# Patient Record
Sex: FEMALE | URBAN - METROPOLITAN AREA
[De-identification: names, ages, dates, MRNs, and addresses within clinical notes are randomized per-mention and may not be internally consistent; named-entity substitution may affect disease eponyms.]

---

## 2017-02-09 VITALS
DIASTOLIC BLOOD PRESSURE: 59 MMHG | OXYGEN SATURATION: 94 % | TEMPERATURE: 96 F | HEIGHT: 65 IN | SYSTOLIC BLOOD PRESSURE: 123 MMHG | WEIGHT: 181.44 LBS | RESPIRATION RATE: 16 BRPM | HEART RATE: 52 BPM

## 2017-02-09 NOTE — ASU PATIENT PROFILE, ADULT - PMH
DM (diabetes mellitus)    HTN (hypertension)    Thyroid disease Depression  anxiety  DM (diabetes mellitus)    HTN (hypertension)    Thyroid disease

## 2017-02-10 ENCOUNTER — OUTPATIENT (OUTPATIENT)
Dept: OUTPATIENT SERVICES | Facility: HOSPITAL | Age: 67
LOS: 1 days | Discharge: HOME | End: 2017-02-10
Payer: COMMERCIAL

## 2017-02-10 VITALS
DIASTOLIC BLOOD PRESSURE: 66 MMHG | RESPIRATION RATE: 16 BRPM | SYSTOLIC BLOOD PRESSURE: 130 MMHG | HEART RATE: 58 BPM | OXYGEN SATURATION: 96 %

## 2017-02-10 DIAGNOSIS — Z90.49 ACQUIRED ABSENCE OF OTHER SPECIFIED PARTS OF DIGESTIVE TRACT: Chronic | ICD-10-CM

## 2017-02-10 DIAGNOSIS — Z98.890 OTHER SPECIFIED POSTPROCEDURAL STATES: Chronic | ICD-10-CM

## 2017-02-10 PROCEDURE — 29881 ARTHRS KNE SRG MNISECTMY M/L: CPT | Mod: LT

## 2017-02-10 RX ORDER — SODIUM CHLORIDE 9 MG/ML
1000 INJECTION, SOLUTION INTRAVENOUS
Qty: 0 | Refills: 0 | Status: DISCONTINUED | OUTPATIENT
Start: 2017-02-10 | End: 2017-02-10

## 2017-02-10 RX ORDER — MORPHINE SULFATE 50 MG/1
2 CAPSULE, EXTENDED RELEASE ORAL
Qty: 0 | Refills: 0 | Status: DISCONTINUED | OUTPATIENT
Start: 2017-02-10 | End: 2017-02-10

## 2017-02-16 DIAGNOSIS — E03.9 HYPOTHYROIDISM, UNSPECIFIED: ICD-10-CM

## 2017-02-16 DIAGNOSIS — Z79.84 LONG TERM (CURRENT) USE OF ORAL HYPOGLYCEMIC DRUGS: ICD-10-CM

## 2017-02-16 DIAGNOSIS — S83.242A OTHER TEAR OF MEDIAL MENISCUS, CURRENT INJURY, LEFT KNEE, INITIAL ENCOUNTER: ICD-10-CM

## 2017-02-16 DIAGNOSIS — E11.9 TYPE 2 DIABETES MELLITUS WITHOUT COMPLICATIONS: ICD-10-CM

## 2017-02-16 DIAGNOSIS — M22.42 CHONDROMALACIA PATELLAE, LEFT KNEE: ICD-10-CM

## 2017-02-16 DIAGNOSIS — K21.9 GASTRO-ESOPHAGEAL REFLUX DISEASE WITHOUT ESOPHAGITIS: ICD-10-CM

## 2017-02-16 DIAGNOSIS — I10 ESSENTIAL (PRIMARY) HYPERTENSION: ICD-10-CM
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
## 2019-11-26 ENCOUNTER — APPOINTMENT (OUTPATIENT)
Dept: ORTHOPEDIC SURGERY | Facility: CLINIC | Age: 69
End: 2019-11-26
Payer: MEDICARE

## 2019-11-26 DIAGNOSIS — M54.5 LOW BACK PAIN: ICD-10-CM

## 2019-11-26 PROBLEM — E07.9 DISORDER OF THYROID, UNSPECIFIED: Chronic | Status: ACTIVE | Noted: 2017-02-09

## 2019-11-26 PROBLEM — F32.9 MAJOR DEPRESSIVE DISORDER, SINGLE EPISODE, UNSPECIFIED: Chronic | Status: ACTIVE | Noted: 2017-02-10

## 2019-11-26 PROBLEM — E11.9 TYPE 2 DIABETES MELLITUS WITHOUT COMPLICATIONS: Chronic | Status: ACTIVE | Noted: 2017-02-09

## 2019-11-26 PROBLEM — Z00.00 ENCOUNTER FOR PREVENTIVE HEALTH EXAMINATION: Status: ACTIVE | Noted: 2019-11-26

## 2019-11-26 PROBLEM — I10 ESSENTIAL (PRIMARY) HYPERTENSION: Chronic | Status: ACTIVE | Noted: 2017-02-09

## 2019-11-26 PROCEDURE — 72100 X-RAY EXAM L-S SPINE 2/3 VWS: CPT

## 2019-11-26 PROCEDURE — 99204 OFFICE O/P NEW MOD 45 MIN: CPT

## 2019-11-26 RX ORDER — CELECOXIB 200 MG/1
200 CAPSULE ORAL TWICE DAILY
Qty: 60 | Refills: 2 | Status: ACTIVE | COMMUNITY
Start: 2019-11-26 | End: 1900-01-01

## 2019-11-26 NOTE — DISCUSSION/SUMMARY
[de-identified] : Patient's diagnosis of spinal stenosis. She was placed on Celebrex 200 mg p.o. b.i.d. for 6 day course. She was advised to use tiger from or BenGay patch is at night to help reduce or spasm and to get a lumbar corset. She was also issued a physical therapy prescription she'll followup with me in a month's time if not improved

## 2019-11-26 NOTE — PHYSICAL EXAM
[de-identified] : AP and lateral lumbar spine shows moderate degenerative changes of the lumbar spine AP specifically show some degenerative scoliosis as well degenerative changes are mostly localized in the lower lumbar facet region. [de-identified] : Patient has moderate paraspinal tenderness and also some spasm of the right side paraspinal musculature. Full rotation of the right hip is noted she is neurovascularly intact distally.

## 2021-06-10 NOTE — ASU PREOP CHECKLIST - HAND OFF
79 y/o female with PMHx of COPD, CHF, RA, stenosis, Diverticulitis, alcoholism (last drink 23 years ago), Meniere disease, GERD, TMJ, SIMÓN, Thrush, Skin CA, Diverticulitis, PVD, Hiatal Hernia, Breast CA, s/p TIA, HLD, Valvular heart disease p/w weakness, fevers, chills. Exam with diffuse wheezing and LLQ TTP. Will obtain labs, CT, and admit.
yes

## 2022-11-15 NOTE — ASU PREOP CHECKLIST - ALLERGIES REVIEWED
WHIT Casper   [facility]  [address]  [place] [address]     PATIENT NAME: Yana Oates  : 1942  DATE: 11/15/22  MRN: [id number]      Billing Provider: WHIT Casper  Level of Service: UT OFFICE/OUTPT VISIT, EST, LEVL III, 20-29 MIN  Patient PCP Information       Provider PCP Type    WHIT Casper General            Reason for Visit / Chief Complaint: 3 month check up (Med refills )       Update PCP  Update Chief Complaint         History of Present Illness / Problem Focused Workflow     Yana Oates presents to the clinic for refills on routine medications  Hx of HTN, DM, GERD, anxiety controlled with medication and diet. Denies any problems      Review of Systems     Review of Systems   Constitutional: Negative.  Negative for activity change, appetite change, chills, fatigue, fever and unexpected weight change.   Eyes:  Negative for visual disturbance.   Respiratory:  Negative for cough, chest tightness and shortness of breath.    Cardiovascular:  Negative for chest pain and leg swelling.   Gastrointestinal:  Negative for abdominal pain, blood in stool, change in bowel habit, nausea, vomiting and change in bowel habit.   Endocrine: Negative for cold intolerance, heat intolerance, polydipsia, polyphagia and polyuria.   Genitourinary:  Negative for frequency.   Integumentary:  Negative for rash.   Neurological:  Negative for dizziness, weakness and headaches.      Medical / Social / Family History     Past Medical History:   Diagnosis Date    Anxiety     Diabetes mellitus, type 2     Essential hypertension, benign     Fatigue     GERD (gastroesophageal reflux disease)     Low back pain     Osteoarthritis     Other long term (current) drug therapy     Strain of muscle and tendon of back wall of thorax, initial encounter        Past Surgical History:   Procedure Laterality Date    APPENDECTOMY      CHOLECYSTECTOMY      HYSTERECTOMY      partial    LIPOMA  RESECTION Left     LUNG LOBECTOMY Right     due to trauma       Social History  Ms.  reports that she has never smoked. She has never used smokeless tobacco. She reports that she does not drink alcohol and does not use drugs.    Family History  Ms.'s family history includes Arthritis in her sister; Asthma in her father; Atrial fibrillation in her sister; Brain cancer in her brother; COPD in her brother, brother, and sister; Cancer in her brother and brother; Cervical cancer in her daughter; Heart disease in her brother and mother; Hypertension in her mother; Lung cancer in her brother; No Known Problems in her brother, brother, daughter, daughter, daughter, daughter, daughter, daughter, sister, sister, and sister; Osteoarthritis in her mother; Thyroid disease in her daughter.    Medications and Allergies     Medications  Outpatient Medications Marked as Taking for the 11/15/22 encounter (Office Visit) with WHIT Ramey   Medication Sig Dispense Refill    blood sugar diagnostic Strp by Misc.(Non-Drug; Combo Route) route. CHECK BLOOD SUGAR ONE TIME DAILY AND AS NEEDED      diclofenac sodium (VOLTAREN) 1 % Gel Apply 2 g topically 2 (two) times daily.      multivitamin-iron-folic acid Tab Take 1 tablet by mouth once daily.      [DISCONTINUED] ALPRAZolam (XANAX) 0.25 MG tablet Take 1 tablet (0.25 mg total) by mouth 2 (two) times daily as needed for Anxiety. 60 tablet 2    [DISCONTINUED] amLODIPine (NORVASC) 10 MG tablet Take 1 tablet (10 mg total) by mouth once daily. 90 tablet 1    [DISCONTINUED] carvediloL (COREG) 6.25 MG tablet Take 1 tablet (6.25 mg total) by mouth 2 (two) times daily with meals. 180 tablet 1    [DISCONTINUED] cloNIDine (CATAPRES) 0.2 MG tablet TAKE ONE-HALF TO ONE TAB TWICE DAILY 180 tablet 1    [DISCONTINUED] ferrous sulfate (IRON, FERROUS SULFATE,) 325 mg (65 mg iron) Tab tablet Take 1 tablet (325 mg total) by mouth once daily. 90 tablet 1    [DISCONTINUED] fluticasone propionate  "(FLONASE) 50 mcg/actuation nasal spray USE 1 SPRAY IN EACH NOSTRIL 2 TIMES A DAY ((SHAKE WELL)) 48 g 2    [DISCONTINUED] gabapentin (NEURONTIN) 100 MG capsule Take 1 capsule (100 mg total) by mouth 3 (three) times daily. 90 capsule 1    [DISCONTINUED] hydroCHLOROthiazide (HYDRODIURIL) 25 MG tablet Take 1 tablet (25 mg total) by mouth once daily. 90 tablet 1    [DISCONTINUED] magnesium chloride (MAG 64) 64 mg TbEC Take 1 tablet (64 mg total) by mouth 2 (two) times daily. 180 tablet 1    [DISCONTINUED] meclizine (ANTIVERT) 12.5 mg tablet TAKE 1 TABLET BY MOUTH 3 TIMES A DAY AS NEEDED FOR DIZZINESS (MAY CAUSE DROWSINESS) 90 tablet 2    [DISCONTINUED] metFORMIN (GLUCOPHAGE) 500 MG tablet Take 1 tablet (500 mg total) by mouth 2 (two) times a day. 180 tablet 1    [DISCONTINUED] omeprazole (PRILOSEC) 20 MG capsule Take 1 capsule (20 mg total) by mouth 2 (two) times a day. 180 capsule 1    [DISCONTINUED] triamcinolone acetonide 0.1% (KENALOG) 0.1 % cream Apply topically 2 (two) times daily. APPLY TO AFFECTED AREA TWO TIMES DAILY 15 g 0       Allergies  Review of patient's allergies indicates:   Allergen Reactions    Ace inhibitors Anaphylaxis    Penicillins Anaphylaxis    Bactrim [sulfamethoxazole-trimethoprim]     Biaxin [clarithromycin]     Ciprofloxacin     Iodinated contrast media     Iodine and iodide containing products Hives    Latex     Mobic [meloxicam]     Trimethoprim     Sulfa (sulfonamide antibiotics) Hives and Rash       Physical Examination     Vitals:    11/15/22 0928   BP: 122/78   BP Location: Left arm   Patient Position: Sitting   BP Method: Medium (Manual)   Pulse: 89   Resp: 20   Temp: 97.6 °F (36.4 °C)   TempSrc: Oral   SpO2: 98%   Weight: 66.3 kg (146 lb 3.2 oz)   Height: 5' 4" (1.626 m)      Physical Exam  Constitutional:       General: She is not in acute distress.     Appearance: Normal appearance.   Eyes:      Conjunctiva/sclera: Conjunctivae normal.   Neck:      Thyroid: No thyromegaly.      " Vascular: No carotid bruit.   Cardiovascular:      Rate and Rhythm: Normal rate and regular rhythm.      Pulses:           Carotid pulses are 3+ on the right side and 3+ on the left side.       Posterior tibial pulses are 2+ on the right side and 2+ on the left side.      Heart sounds: Normal heart sounds. No murmur heard.  Pulmonary:      Effort: Pulmonary effort is normal. No accessory muscle usage or respiratory distress.      Breath sounds: Normal breath sounds. No wheezing, rhonchi or rales.   Abdominal:      General: Bowel sounds are normal.      Palpations: Abdomen is soft.      Tenderness: There is no abdominal tenderness.   Musculoskeletal:         General: Normal range of motion.      Cervical back: Neck supple.      Right lower leg: No edema.      Left lower leg: No edema.   Skin:     General: Skin is warm and dry.      Coloration: Skin is not jaundiced or pale.   Neurological:      Mental Status: She is alert and oriented to person, place, and time.      Gait: Gait is intact.   Psychiatric:         Mood and Affect: Mood normal.         Behavior: Behavior normal. Behavior is cooperative.         Thought Content: Thought content normal. Thought content does not include suicidal ideation.         Cognition and Memory: Memory is not impaired.         Judgment: Judgment normal.        Assessment and Plan (including Health Maintenance)      Problem List  Smart Sets  Document Outside HM   :  Lab Results   Component Value Date    HGBA1C 6.5 09/22/2022     Lab Results   Component Value Date    CHOL 221 (H) 09/22/2022    CHOL 218 (H) 03/24/2022    CHOL 207 (H) 09/22/2021     Lab Results   Component Value Date    HDL 78 (H) 09/22/2022    HDL 81 (H) 03/24/2022    HDL 78 (H) 09/22/2021     Lab Results   Component Value Date    LDLCALC 119 09/22/2022    LDLCALC 117 03/24/2022    LDLCALC 115 09/22/2021     Lab Results   Component Value Date    TRIG 122 09/22/2022    TRIG 98 03/24/2022    TRIG 68 09/22/2021     Lab  Results   Component Value Date    CHOLHDL 2.8 09/22/2022    CHOLHDL 2.7 03/24/2022    CHOLHDL 2.7 09/22/2021     CMP  Sodium   Date Value Ref Range Status   09/22/2022 135 (L) 136 - 145 mmol/L Final     Potassium   Date Value Ref Range Status   09/22/2022 4.0 3.5 - 5.1 mmol/L Final     Chloride   Date Value Ref Range Status   09/22/2022 97 (L) 98 - 107 mmol/L Final     CO2   Date Value Ref Range Status   09/22/2022 32 21 - 32 mmol/L Final     Glucose   Date Value Ref Range Status   09/22/2022 102 74 - 106 mg/dL Final     BUN   Date Value Ref Range Status   09/22/2022 9 7 - 18 mg/dL Final     Creatinine   Date Value Ref Range Status   09/22/2022 0.79 0.55 - 1.02 mg/dL Final     Calcium   Date Value Ref Range Status   09/22/2022 10.0 8.5 - 10.1 mg/dL Final     Total Protein   Date Value Ref Range Status   09/22/2022 8.7 (H) 6.4 - 8.2 g/dL Final     Albumin   Date Value Ref Range Status   09/22/2022 4.3 3.5 - 5.0 g/dL Final     Bilirubin, Total   Date Value Ref Range Status   09/22/2022 0.4 >0.0 - 1.2 mg/dL Final     Alk Phos   Date Value Ref Range Status   09/22/2022 123 55 - 142 U/L Final     AST   Date Value Ref Range Status   09/22/2022 8 (L) 15 - 37 U/L Final     ALT   Date Value Ref Range Status   09/22/2022 17 13 - 56 U/L Final     Anion Gap   Date Value Ref Range Status   09/22/2022 10 7 - 16 mmol/L Final     eGFR   Date Value Ref Range Status   09/22/2022 76 >=60 mL/min/1.73m² Final             Health Maintenance Due   Topic Date Due    Pneumococcal Vaccines (Age 65+) (1 - PCV) Never done    DEXA Scan  Never done       Problem List Items Addressed This Visit          Neuro    Diabetic mononeuropathy associated with type 2 diabetes mellitus    Relevant Medications    metFORMIN (GLUCOPHAGE) 500 MG tablet    gabapentin (NEURONTIN) 100 MG capsule       Psychiatric    Anxiety    Relevant Medications    ALPRAZolam (XANAX) 0.25 MG tablet       Cardiac/Vascular    Essential hypertension, benign    Relevant Medications  done    magnesium chloride (MAG 64) 64 mg TbEC    hydroCHLOROthiazide (HYDRODIURIL) 25 MG tablet    cloNIDine (CATAPRES) 0.2 MG tablet    carvediloL (COREG) 6.25 MG tablet    amLODIPine (NORVASC) 10 MG tablet       Oncology    Iron deficiency anemia    Relevant Medications    ferrous sulfate (IRON, FERROUS SULFATE,) 325 mg (65 mg iron) Tab tablet       Endocrine    Diabetes mellitus, type 2 - Primary    Relevant Medications    metFORMIN (GLUCOPHAGE) 500 MG tablet       GI    GERD (gastroesophageal reflux disease)    Relevant Medications    omeprazole (PRILOSEC) 20 MG capsule     Other Visit Diagnoses       Allergy, subsequent encounter        Relevant Medications    fluticasone propionate (FLONASE) 50 mcg/actuation nasal spray    Encounter for long-term (current) use of medications        Dizziness        Relevant Medications    meclizine (ANTIVERT) 12.5 mg tablet          Type 2 diabetes mellitus without complication, without long-term current use of insulin  Comments:  Continue current medication and diet. Hgb A1c goal < 7.0  Orders:  -     metFORMIN (GLUCOPHAGE) 500 MG tablet; Take 1 tablet (500 mg total) by mouth 2 (two) times a day.  Dispense: 180 tablet; Refill: 1    Gastroesophageal reflux disease, unspecified whether esophagitis present  Comments:  Continue current medication  Orders:  -     omeprazole (PRILOSEC) 20 MG capsule; Take 1 capsule (20 mg total) by mouth 2 (two) times a day.  Dispense: 180 capsule; Refill: 1    Essential hypertension, benign  Comments:  continue current medication and low sodium diet.  Orders:  -     magnesium chloride (MAG 64) 64 mg TbEC; Take 1 tablet (64 mg total) by mouth 2 (two) times daily.  Dispense: 180 tablet; Refill: 1  -     hydroCHLOROthiazide (HYDRODIURIL) 25 MG tablet; Take 1 tablet (25 mg total) by mouth once daily.  Dispense: 90 tablet; Refill: 1  -     cloNIDine (CATAPRES) 0.2 MG tablet; TAKE ONE-HALF TO ONE TAB TWICE DAILY  Dispense: 180 tablet; Refill: 1  -      carvediloL (COREG) 6.25 MG tablet; Take 1 tablet (6.25 mg total) by mouth 2 (two) times daily with meals.  Dispense: 180 tablet; Refill: 1  -     amLODIPine (NORVASC) 10 MG tablet; Take 1 tablet (10 mg total) by mouth once daily.  Dispense: 90 tablet; Refill: 1    Diabetic mononeuropathy associated with type 2 diabetes mellitus  Comments:  Continue neurontin at bedtime  Orders:  -     gabapentin (NEURONTIN) 100 MG capsule; Take 1 capsule (100 mg total) by mouth 3 (three) times daily.  Dispense: 90 capsule; Refill: 1    Allergy, subsequent encounter  -     fluticasone propionate (FLONASE) 50 mcg/actuation nasal spray; USE 1 SPRAY IN EACH NOSTRIL 2 TIMES A DAY ((SHAKE WELL))  Dispense: 48 g; Refill: 2    Iron deficiency anemia, unspecified iron deficiency anemia type  Comments:  Continue high protein diet and ferrous sulfate  Orders:  -     ferrous sulfate (IRON, FERROUS SULFATE,) 325 mg (65 mg iron) Tab tablet; Take 1 tablet (325 mg total) by mouth once daily.  Dispense: 90 tablet; Refill: 1    Encounter for long-term (current) use of medications  -     POCT Urine Drug Screen Presump    Anxiety  Comments:  Continue current medication. Denies diversion,  good, UDS consistent, no signs of aberrant behavior noted  Orders:  -     ALPRAZolam (XANAX) 0.25 MG tablet; Take 1 tablet (0.25 mg total) by mouth 2 (two) times daily as needed for Anxiety.  Dispense: 60 tablet; Refill: 2    Dizziness  -     meclizine (ANTIVERT) 12.5 mg tablet; TAKE 1 TABLET BY MOUTH 3 TIMES A DAY AS NEEDED FOR DIZZINESS (MAY CAUSE DROWSINESS)  Dispense: 90 tablet; Refill: 2    Other orders  -     triamcinolone acetonide 0.1% (KENALOG) 0.1 % cream; Apply topically 2 (two) times daily. APPLY TO AFFECTED AREA TWO TIMES DAILY  Dispense: 15 g; Refill: 0     Health Maintenance Topics with due status: Not Due       Topic Last Completion Date    Eye Exam 06/13/2022    Diabetes Urine Screening 06/24/2022    Foot Exam 09/22/2022    Lipid Panel 09/22/2022     Hemoglobin A1c 09/22/2022       Procedures     Future Appointments   Date Time Provider Department Center   1/3/2023 10:00 AM AW[facility] NURSE [facility] NINA Klein        Follow up in about 3 months (around 2/15/2023).     Signature:  WHIT Casper    Date of encounter: 11/15/22

## 2023-06-15 NOTE — HISTORY OF PRESENT ILLNESS
[de-identified] : LOW BACK PAIN - DIFFICULTY WALKING - BENDING - SENT FOR NEW LUMBAR XRAYS TODAYPatient states it having chronic back pain for many years but now has 6 which is a having increasing discomfort she has difficulty with bending and has pain which made it difficult for her to sleep at night. She denies any weakness or numbness in the lower extremities or change in bowel or bladder habits. Render In Strict Bullet Format?: No Continue Regimen: Betamethasone ointment/Mupirocin ointment mixed and applied twice daily to affected areas Detail Level: Zone

## 2024-12-18 ENCOUNTER — EMERGENCY (EMERGENCY)
Facility: HOSPITAL | Age: 74
LOS: 1 days | Discharge: HOME | End: 2024-12-18
Attending: EMERGENCY MEDICINE | Admitting: EMERGENCY MEDICINE
Payer: MEDICARE

## 2024-12-18 VITALS
OXYGEN SATURATION: 96 % | SYSTOLIC BLOOD PRESSURE: 140 MMHG | TEMPERATURE: 98 F | HEIGHT: 65 IN | RESPIRATION RATE: 16 BRPM | DIASTOLIC BLOOD PRESSURE: 66 MMHG | WEIGHT: 199.96 LBS | HEART RATE: 50 BPM

## 2024-12-18 DIAGNOSIS — Z90.49 ACQUIRED ABSENCE OF OTHER SPECIFIED PARTS OF DIGESTIVE TRACT: Chronic | ICD-10-CM

## 2024-12-18 DIAGNOSIS — Z98.890 OTHER SPECIFIED POSTPROCEDURAL STATES: Chronic | ICD-10-CM

## 2024-12-18 PROCEDURE — 99283 EMERGENCY DEPT VISIT LOW MDM: CPT | Mod: 25

## 2024-12-18 PROCEDURE — 73630 X-RAY EXAM OF FOOT: CPT

## 2024-12-18 PROCEDURE — 73630 X-RAY EXAM OF FOOT: CPT | Mod: 26,LT

## 2024-12-18 PROCEDURE — 99284 EMERGENCY DEPT VISIT MOD MDM: CPT

## 2024-12-18 RX ADMIN — Medication 1 TABLET(S): at 03:06

## 2024-12-18 NOTE — ED PROVIDER NOTE - NSFOLLOWUPINSTRUCTIONS_ED_ALL_ED_FT
Take tylenol 650mg or motrin 400-800mg as needed every 4-6 hours for pain.   REST- Rest your hurting/injured joint or extremity to decrease pain and swelling for 24-48 hours    ICE- Apply ice to area of pain to decreased inflammation and pain, put towel/barrier between ice and skin. You can keep ice on for 20 minutes at a time 4-8 times daily   COMPRESSION- Wear ace wrap or brace for support to reduce swelling.  Make sure not to wrap too tight, loosen if skin feeling numb/tingling or skin turns blue   ELEVATION- Elevate hurting/injured area 6 or more inches about level of heart to decrease swelling/inflammation.  Use pillow under joint to elevate area    Please take full course of antibiotics as prescribed    You can follow up in podiatry clinic on [address] Call to arrange appointment [phone]  You may call our referrals coordinator at [phone] Monday to Friday 11am-7pm for assistance with making an appointment    Cellulitis    Cellulitis is a skin infection caused by bacteria. This condition occurs most often in the arms and lower legs but can occur anywhere over the body. Symptoms include redness, swelling, warm skin, tenderness, and chills/fever. If you were prescribed an antibiotic medicine, take it as told by your health care provider. Do not stop taking the antibiotic even if you start to feel better.    SEEK IMMEDIATE MEDICAL CARE IF YOU HAVE ANY OF THE FOLLOWING SYMPTOMS: worsening fever, red streaks coming from affected area, vomiting or diarrhea, or dizziness/lightheadedness.

## 2024-12-18 NOTE — ED ADULT NURSE NOTE - NSFALLHARMRISKINTERV_ED_ALL_ED

## 2024-12-18 NOTE — ED PROVIDER NOTE - OBJECTIVE STATEMENT
74 F pmh htn, hld, dm p/w L foot pain/swelling x few days.  pt reports L lateral foot pain/swelling w/ reddish color x 2-3 days, pain is worse w/ walking and bearing wt.  no trauma.  taking advil w/ some relief.  denies f/c, HA, dizziness, chest pain, sob, nv, numbness/weakness, paresthesia, calf pain/swelling, open wounds, rash

## 2024-12-18 NOTE — ED PROVIDER NOTE - PHYSICAL EXAMINATION
Vitals reviewed  Gen: well appearing, nad, speaking in full sentences  Skin: wwp, no rash/lesions  HEENT: ncat, eomi, mmm, airway patent   CV: rrr, no audible m/r/g  Resp: symmetrical expansion, ctab, no w/r/r  b/l LEs: minimal swelling over L lateral foot w/o any localized joint ttp, mild ttp over L plantar fascia, L foot slightly warm/erythematous compared to R but no streaking, no open wounds, FROM all joints, SILT, brisk cap refill, DP/PT pulses 2+, no calf ttp/edema   Neuro: alert/oriented, no focal deficits, steady gait w/o assistance

## 2024-12-18 NOTE — ED PROVIDER NOTE - ATTENDING APP SHARED VISIT CONTRIBUTION OF CARE
I discussed the care of the pt directly with the ACP while the pt was in the ED.  I have reviewed the ACP note and agree w/ the history, exam and plan of care other than as noted above.    Neurovascular intact on exam otherwise well-appearing

## 2024-12-18 NOTE — ED PROVIDER NOTE - CLINICAL SUMMARY MEDICAL DECISION MAKING FREE TEXT BOX
74 F pmh htn, hld, dm p/w atraumatic L foot pain/swelling x few days.  on exam pt afebrile, well appearing, b/l LEs: minimal swelling over L lateral foot w/o any localized joint ttp, mild ttp over L plantar fascia, L foot slightly warm/erythematous compared to R but no streaking, no open wounds, FROM all joints, SILT, brisk cap refill, DP/PT pulses 2+, no calf ttp/edema.  possible msk pain vs early cellulitis vs gout vs r/o fx.      xray shows no acute fx or dislocation.  will tx for possible cellulitis and educated on supportive care.  f/u with pmd/podiatry.  discussed strict return parameters

## 2024-12-18 NOTE — ED PROVIDER NOTE - PATIENT PORTAL LINK FT
You can access the FollowMyHealth Patient Portal offered by [facility] by registering at the following website: [email or web address] By joining [email or web address]’s FollowMyHealth portal, you will also be able to view your health information using other applications (apps) compatible with our system.

## 2024-12-18 NOTE — ED ADULT NURSE NOTE - OBJECTIVE STATEMENT
Patient is a 73 yo female from home with son with c/o left foot pain x a few days.  left foot is somewhat swollen compared to the right.  patient states pain in somewhat present, however, when she walks, more pain to the dorsal aspect of the left foot and also the sole of the left foot.  denies trauma.

## 2025-08-11 ENCOUNTER — EMERGENCY (EMERGENCY)
Facility: HOSPITAL | Age: 75
LOS: 1 days | End: 2025-08-11
Attending: EMERGENCY MEDICINE | Admitting: EMERGENCY MEDICINE
Payer: MEDICARE

## 2025-08-11 VITALS
OXYGEN SATURATION: 97 % | WEIGHT: 197.98 LBS | DIASTOLIC BLOOD PRESSURE: 85 MMHG | TEMPERATURE: 98 F | RESPIRATION RATE: 18 BRPM | HEART RATE: 55 BPM | SYSTOLIC BLOOD PRESSURE: 143 MMHG

## 2025-08-11 VITALS
OXYGEN SATURATION: 95 % | TEMPERATURE: 98 F | HEART RATE: 49 BPM | SYSTOLIC BLOOD PRESSURE: 130 MMHG | RESPIRATION RATE: 18 BRPM | DIASTOLIC BLOOD PRESSURE: 75 MMHG

## 2025-08-11 DIAGNOSIS — Z90.49 ACQUIRED ABSENCE OF OTHER SPECIFIED PARTS OF DIGESTIVE TRACT: Chronic | ICD-10-CM

## 2025-08-11 DIAGNOSIS — Z98.890 OTHER SPECIFIED POSTPROCEDURAL STATES: Chronic | ICD-10-CM

## 2025-08-11 PROCEDURE — 93010 ELECTROCARDIOGRAM REPORT: CPT

## 2025-08-11 PROCEDURE — 71250 CT THORAX DX C-: CPT | Mod: 26

## 2025-08-11 PROCEDURE — 82962 GLUCOSE BLOOD TEST: CPT

## 2025-08-11 PROCEDURE — 71250 CT THORAX DX C-: CPT

## 2025-08-11 PROCEDURE — 99285 EMERGENCY DEPT VISIT HI MDM: CPT | Mod: 25

## 2025-08-11 PROCEDURE — 93005 ELECTROCARDIOGRAM TRACING: CPT

## 2025-08-11 PROCEDURE — 99285 EMERGENCY DEPT VISIT HI MDM: CPT

## 2025-08-11 RX ORDER — EMPAGLIFLOZIN 25 MG/1
1 TABLET, FILM COATED ORAL
Refills: 0 | DISCHARGE

## 2025-08-11 RX ORDER — INSULIN GLARGINE-YFGN 100 [IU]/ML
0 INJECTION, SOLUTION SUBCUTANEOUS
Refills: 0 | DISCHARGE

## 2025-08-11 RX ORDER — IBUPROFEN 200 MG
600 TABLET ORAL ONCE
Refills: 0 | Status: COMPLETED | OUTPATIENT
Start: 2025-08-11 | End: 2025-08-11

## 2025-08-11 RX ORDER — SITAGLIPTIN 100 MG/1
1 TABLET, FILM COATED ORAL
Refills: 0 | DISCHARGE

## 2025-08-11 RX ORDER — LEVOTHYROXINE SODIUM 300 MCG
1 TABLET ORAL
Refills: 0 | DISCHARGE

## 2025-08-11 RX ORDER — ATORVASTATIN CALCIUM 80 MG/1
1 TABLET, FILM COATED ORAL
Refills: 0 | DISCHARGE

## 2025-08-11 RX ORDER — CLONAZEPAM 0.5 MG/1
0 TABLET ORAL
Refills: 0 | DISCHARGE

## 2025-08-11 RX ADMIN — Medication 600 MILLIGRAM(S): at 16:15

## 2025-08-13 DIAGNOSIS — I10 ESSENTIAL (PRIMARY) HYPERTENSION: ICD-10-CM
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
## 2025-08-13 DIAGNOSIS — K44.9 DIAPHRAGMATIC HERNIA WITHOUT OBSTRUCTION OR GANGRENE: ICD-10-CM

## 2025-08-13 DIAGNOSIS — R00.1 BRADYCARDIA, UNSPECIFIED: ICD-10-CM

## 2025-08-13 DIAGNOSIS — R07.89 OTHER CHEST PAIN: ICD-10-CM

## 2025-08-13 DIAGNOSIS — E78.00 PURE HYPERCHOLESTEROLEMIA, UNSPECIFIED: ICD-10-CM

## 2025-08-13 DIAGNOSIS — S80.01XA CONTUSION OF RIGHT KNEE, INITIAL ENCOUNTER: ICD-10-CM

## 2025-08-13 DIAGNOSIS — S80.02XA CONTUSION OF LEFT KNEE, INITIAL ENCOUNTER: ICD-10-CM

## 2025-08-13 DIAGNOSIS — E03.9 HYPOTHYROIDISM, UNSPECIFIED: ICD-10-CM

## 2025-08-13 DIAGNOSIS — E11.9 TYPE 2 DIABETES MELLITUS WITHOUT COMPLICATIONS: ICD-10-CM
